# Patient Record
(demographics unavailable — no encounter records)

---

## 2025-02-21 NOTE — ASSESSMENT
[FreeTextEntry1] : 1. Hypothyroid  HAD ALLERGIC REACTION TO GREEN DYE OF LEVOTHYROXINE 75 MCG Plan: - Levothyroxine 50 mcg daily - check labs 1 week after discontinuation all supplements. WILL CALL YOU TO DISCUSS THE LAB RESULTS AFTER THEY ARE RECIEVED AND REVIEWED. May consider Tirosint instead as it is dye-free.   2. MNG- s/p hemithyroidectomy  Plan: - Thyroid US in March 2025 - Requested pathology report from [facility]  3. h/o primary hyperparathyroidism- s/p parathyroidectomy  No h/o nephrolithiasis No h/o fragility Fx Plan: - Labs- PTH, BMP  4. Vitamin deficiency screening Plan: - will check Vit D and Vit B12 levels.   Follow up in 3 month

## 2025-02-21 NOTE — HISTORY OF PRESENT ILLNESS
[FreeTextEntry1] : 55 year old women with medical h/o MNG- s/p Lt hemithyroidectomy, hypothyroidism, PHPT- s/p parathyroidectomy, and HTN presented for follow up. He used to follow up with Dr. Gomes until recently.   Lab-  March 2024- TSH 2.8, Free T4 of 1.2 Oct 2023- TSH 3.4  Imaging-  March 2024- Thyroid US Lt thyroid lobe is absent rmp- 0.7 x 0.3 x 0.7 cm hypoechoic nodule, TR-4, stable rmp- 0.7 x 0.4 x 0.5 cm cystic nodule, TR-1, stable rmp- 0.8 x 0.3 x 0.6 cm isoechoic solid nodule, TR-3, stable   She had Lt hemithyroidectomy in March 2020- no significant pathological abnormality per report.  Her levothyroxine dose was recently increased from 50mcg (white) to 75 mcg (green dye). 2days after dose increase she noticed feeling different- tongue swallowing. She went to ER and was asked to switch back to levothyroxine 50 mcg. Symptoms resolved after discontinuation of LT4 75. No prior h/o allergic reactions.   She has been taking levothyroxine since hemithyroidectomy 5 years ago. She takes LT4 with lisinopril half an hour before breakfast.  No compressive symptoms.  No h/o neck radiation.  Menopause at age 49.   No FHx of Endocrine disorders  She had three glans parathyroidectomy and re-implantation of parathyroid gland into Rt SCM on March 2020 at [place] ([facility]).  Intermittently takes vit D tablets and other supplements.  Consumes dairy.   Mother- breast cancer

## 2025-05-08 NOTE — HISTORY OF PRESENT ILLNESS
[FreeTextEntry1] : 55 year old women with medical h/o MNG- s/p Lt hemithyroidectomy, hypothyroidism, PHPT- s/p parathyroidectomy, and HTN presented for follow up. He used to follow up with Dr. Gomes until recently.   Labs-  April 2025- TSH 2.5, Free T4 of 1.3, Free T3 of 3.1. March 2025- HA1C 5.6%, eGFR 102, PTH of 22 with calcium of 9, TSH 3.2, Free T4 of 1.2, Free T3 of 3.1, Vit B12 at 620, Vit D 54 March 2024- TSH 2.8, Free T4 of 1.2 Oct 2023- TSH 3.4  Imaging-  March 2025- Thyroid US Lt thyroid lobe is absent rmp- 0.8 x 0.4 x 0.7 cm hypoechoic nodule, TR-4, stable rmp- 1.1 x 0.5 x 1.1 cm cmplex nodule, TR-3, INTERVALLY LARGER rmp- 0.7 x 0.4 x 0.6 cm cyst, TR-1, stable.   March 2024- Thyroid US Lt thyroid lobe is absent rmp- 0.7 x 0.3 x 0.7 cm hypoechoic nodule, TR-4, stable rmp- 0.7 x 0.4 x 0.5 cm cystic nodule, TR-1, stable rmp- 0.8 x 0.3 x 0.6 cm isoechoic solid nodule, TR-3, stable   She had Lt hemithyroidectomy in March 2020- no significant pathological abnormality per report.  Her levothyroxine dose was recently increased from 50mcg (white) to 75 mcg (green dye). 2days after dose increase she noticed feeling different- tongue swallowing. She went to ER and was asked to switch back to levothyroxine 50 mcg. Symptoms resolved after discontinuation of LT4 75. No prior h/o allergic reactions.   She has been taking levothyroxine since hemithyroidectomy 5 years ago. She takes LT4 with lisinopril half an hour before breakfast.  No compressive symptoms.  No h/o neck radiation.  Menopause at age 49.   No FHx of Endocrine disorders  She had three glans parathyroidectomy and re-implantation of parathyroid gland into Rt SCM on March 2020 at [place] ([facility]).  Intermittently takes vit D tablets and other supplements.  Consumes dairy.   Mother- breast cancer

## 2025-05-08 NOTE — ASSESSMENT
[FreeTextEntry1] : 1. Hypothyroid  HAD ALLERGIC REACTION TO GREEN DYE OF LEVOTHYROXINE 75 MCG Plan: - c/w Levothyroxine 50 mcg daily - check labs 1 week after discontinuation all supplements.    2. MNG- s/p hemithyroidectomy  Plan: - repeat Thyroid US in March 2026   3. h/o primary hyperparathyroidism- s/p parathyroidectomy  No h/o nephrolithiasis No h/o fragility Fx Plan: - will check again PTH, BMP next appointment    4. Vitamin D deficiency  Plan: - start Vit D 2000 IU daily  - will check Vit D    Follow up in 1 year